# Patient Record
Sex: MALE | Race: WHITE | Employment: FULL TIME | URBAN - METROPOLITAN AREA
[De-identification: names, ages, dates, MRNs, and addresses within clinical notes are randomized per-mention and may not be internally consistent; named-entity substitution may affect disease eponyms.]

---

## 2018-04-30 ENCOUNTER — HOSPITAL ENCOUNTER (EMERGENCY)
Age: 33
Discharge: HOME | End: 2018-04-30
Attending: EMERGENCY MEDICINE
Payer: COMMERCIAL

## 2018-04-30 ENCOUNTER — APPOINTMENT (OUTPATIENT)
Dept: CT IMAGING | Age: 33
End: 2018-04-30
Payer: COMMERCIAL

## 2018-04-30 VITALS
TEMPERATURE: 98.2 F | DIASTOLIC BLOOD PRESSURE: 84 MMHG | OXYGEN SATURATION: 100 % | BODY MASS INDEX: 28 KG/M2 | HEART RATE: 67 BPM | WEIGHT: 200 LBS | RESPIRATION RATE: 17 BRPM | HEIGHT: 71 IN | SYSTOLIC BLOOD PRESSURE: 132 MMHG

## 2018-04-30 DIAGNOSIS — S05.12XA PERIORBITAL CONTUSION OF LEFT EYE, INITIAL ENCOUNTER: ICD-10-CM

## 2018-04-30 DIAGNOSIS — S00.81XA ABRASION OF FACE, INITIAL ENCOUNTER: ICD-10-CM

## 2018-04-30 DIAGNOSIS — S02.2XXA CLOSED FRACTURE OF NASAL BONE, INITIAL ENCOUNTER: Primary | ICD-10-CM

## 2018-04-30 DIAGNOSIS — S09.90XA INJURY OF HEAD, INITIAL ENCOUNTER: ICD-10-CM

## 2018-04-30 PROCEDURE — 6370000000 HC RX 637 (ALT 250 FOR IP): Performed by: EMERGENCY MEDICINE

## 2018-04-30 PROCEDURE — 99283 EMERGENCY DEPT VISIT LOW MDM: CPT

## 2018-04-30 PROCEDURE — 70486 CT MAXILLOFACIAL W/O DYE: CPT

## 2018-04-30 RX ORDER — SULFAMETHOXAZOLE AND TRIMETHOPRIM 800; 160 MG/1; MG/1
1 TABLET ORAL ONCE
Status: COMPLETED | OUTPATIENT
Start: 2018-04-30 | End: 2018-04-30

## 2018-04-30 RX ORDER — SULFAMETHOXAZOLE AND TRIMETHOPRIM 800; 160 MG/1; MG/1
1 TABLET ORAL 2 TIMES DAILY
Qty: 14 TABLET | Refills: 0 | Status: SHIPPED | OUTPATIENT
Start: 2018-04-30 | End: 2018-05-07

## 2018-04-30 RX ADMIN — SULFAMETHOXAZOLE AND TRIMETHOPRIM 1 TABLET: 800; 160 TABLET ORAL at 21:51

## 2018-04-30 ASSESSMENT — ENCOUNTER SYMPTOMS
SORE THROAT: 0
PHOTOPHOBIA: 0
SHORTNESS OF BREATH: 0
ABDOMINAL PAIN: 0
TROUBLE SWALLOWING: 0
DIARRHEA: 0
NAUSEA: 0
VOMITING: 0

## 2018-04-30 ASSESSMENT — PAIN DESCRIPTION - LOCATION: LOCATION: NOSE

## 2018-04-30 ASSESSMENT — PAIN SCALES - GENERAL: PAINLEVEL_OUTOF10: 2

## 2021-07-12 ENCOUNTER — APPOINTMENT (OUTPATIENT)
Dept: GENERAL RADIOLOGY | Age: 36
End: 2021-07-12
Payer: COMMERCIAL

## 2021-07-12 ENCOUNTER — HOSPITAL ENCOUNTER (EMERGENCY)
Age: 36
Discharge: HOME | End: 2021-07-12
Attending: EMERGENCY MEDICINE
Payer: COMMERCIAL

## 2021-07-12 VITALS
OXYGEN SATURATION: 98 % | BODY MASS INDEX: 29.4 KG/M2 | HEART RATE: 63 BPM | SYSTOLIC BLOOD PRESSURE: 112 MMHG | DIASTOLIC BLOOD PRESSURE: 65 MMHG | WEIGHT: 210 LBS | RESPIRATION RATE: 16 BRPM | HEIGHT: 71 IN | TEMPERATURE: 98.2 F

## 2021-07-12 DIAGNOSIS — S39.012A STRAIN OF LUMBAR REGION, INITIAL ENCOUNTER: Primary | ICD-10-CM

## 2021-07-12 PROCEDURE — 6370000000 HC RX 637 (ALT 250 FOR IP): Performed by: EMERGENCY MEDICINE

## 2021-07-12 PROCEDURE — 72072 X-RAY EXAM THORAC SPINE 3VWS: CPT

## 2021-07-12 PROCEDURE — 6360000002 HC RX W HCPCS: Performed by: EMERGENCY MEDICINE

## 2021-07-12 PROCEDURE — 99283 EMERGENCY DEPT VISIT LOW MDM: CPT

## 2021-07-12 PROCEDURE — 72100 X-RAY EXAM L-S SPINE 2/3 VWS: CPT

## 2021-07-12 PROCEDURE — 96372 THER/PROPH/DIAG INJ SC/IM: CPT

## 2021-07-12 RX ORDER — HYDROCODONE BITARTRATE AND ACETAMINOPHEN 5; 325 MG/1; MG/1
1 TABLET ORAL ONCE
Status: COMPLETED | OUTPATIENT
Start: 2021-07-12 | End: 2021-07-12

## 2021-07-12 RX ORDER — AZELASTINE 1 MG/ML
1 SPRAY, METERED NASAL 2 TIMES DAILY
COMMUNITY
Start: 2021-05-25

## 2021-07-12 RX ORDER — CETIRIZINE HYDROCHLORIDE 10 MG/1
10 TABLET ORAL DAILY
COMMUNITY

## 2021-07-12 RX ORDER — LIDOCAINE 50 MG/G
1 PATCH TOPICAL DAILY
Qty: 10 PATCH | Refills: 0 | Status: SHIPPED | OUTPATIENT
Start: 2021-07-12 | End: 2021-07-22

## 2021-07-12 RX ORDER — EPINEPHRINE 0.3 MG/.3ML
INJECTION SUBCUTANEOUS
COMMUNITY
Start: 2021-05-24

## 2021-07-12 RX ORDER — HYDROCODONE BITARTRATE AND ACETAMINOPHEN 5; 325 MG/1; MG/1
1 TABLET ORAL EVERY 6 HOURS PRN
Qty: 10 TABLET | Refills: 0 | Status: SHIPPED | OUTPATIENT
Start: 2021-07-12 | End: 2021-07-15

## 2021-07-12 RX ORDER — ORPHENADRINE CITRATE 30 MG/ML
60 INJECTION INTRAMUSCULAR; INTRAVENOUS ONCE
Status: COMPLETED | OUTPATIENT
Start: 2021-07-12 | End: 2021-07-12

## 2021-07-12 RX ORDER — MORPHINE SULFATE 4 MG/ML
4 INJECTION, SOLUTION INTRAMUSCULAR; INTRAVENOUS ONCE
Status: COMPLETED | OUTPATIENT
Start: 2021-07-12 | End: 2021-07-12

## 2021-07-12 RX ADMIN — MORPHINE SULFATE 4 MG: 4 INJECTION INTRAVENOUS at 09:50

## 2021-07-12 RX ADMIN — HYDROCODONE BITARTRATE AND ACETAMINOPHEN 1 TABLET: 5; 325 TABLET ORAL at 11:24

## 2021-07-12 RX ADMIN — ORPHENADRINE CITRATE 60 MG: 30 INJECTION INTRAMUSCULAR; INTRAVENOUS at 11:05

## 2021-07-12 ASSESSMENT — ENCOUNTER SYMPTOMS
VOMITING: 0
CHEST TIGHTNESS: 0
BACK PAIN: 1
SORE THROAT: 0
ABDOMINAL PAIN: 1
DIARRHEA: 0
NAUSEA: 0
COUGH: 0
PHOTOPHOBIA: 0
CONSTIPATION: 0
SHORTNESS OF BREATH: 0
RHINORRHEA: 0

## 2021-07-12 ASSESSMENT — PAIN SCALES - GENERAL
PAINLEVEL_OUTOF10: 10

## 2021-07-12 NOTE — ED PROVIDER NOTES
[id number] [facility]  [address] [facility] [address]  Phone: [phone]  Fax: [phone]        Pt Name: Boaz Rodriguez  MRN: [id number]  [place] 1985  Date of evaluation: 7/12/21      CHIEF COMPLAINT     Chief Complaint   Patient presents with    Back Pain         HISTORY OF PRESENT ILLNESS  (Location/Symptom, Timing/Onset, Context/Setting, Quality, Duration, Modifying Factors, Severity.)    Boaz Rodriguez is a 39 y.o. male who presents with with back pain, that started in his low back. Now, it radiates through his whole back, his abdomen, and both legs. The pain is worse in the left leg. The pain started on Friday. The he states the pain stops at his knees. No history of trauma. He states he started playing slow pitch softball and noticed pain in his hamstrings about 1 month ago. He denies any heavy lifting. He is able to ambulate. He feels he is having a lot of back spasms. Denies numbness. He does feel tingling in both legs, worse on the left. He reports weakness in both legs while walking. No loss of bowel or bladder control. No saddle anesthesia. He is not an IV drug user. No anticoagulation. No history of malignancy. He states he saw a chiropractor in the past and that he was told he must have broken his back in the past because they thought they saw compression fractures. This was when he was having some neck issues. He states he saw the chiropractor for 8 weeks and the discomfort resolved. No prior MRI or CT. REVIEW OF SYSTEMS    (2-9 systems for level 4, 10 or more for level 5)     Review of Systems   Constitutional: Negative for chills and fever. HENT: Negative for congestion, rhinorrhea and sore throat. Eyes: Negative for photophobia and visual disturbance. Respiratory: Negative for cough, chest tightness and shortness of breath. Cardiovascular: Negative for chest pain and palpitations. Gastrointestinal: Positive for abdominal pain.  Negative for constipation, diarrhea, nausea and vomiting. Genitourinary: Negative for dysuria, frequency and urgency. Musculoskeletal: Positive for back pain. Negative for neck pain. Skin: Negative for rash and wound. Neurological: Positive for weakness. Negative for dizziness, light-headedness, numbness and headaches. Hematological: Negative for adenopathy. Does not bruise/bleed easily. PAST MEDICAL HISTORY    has a past medical history of Deviated septum. SURGICAL HISTORY      has a past surgical history that includes [place] tooth extraction. CURRENTMEDICATIONS       Previous Medications    AZELASTINE (ASTELIN) 0.1 % NASAL SPRAY    1 spray by Nasal route 2 times daily    CETIRIZINE (ZYRTEC) 10 MG TABLET    Take 10 mg by mouth daily    EPINEPHRINE (EPIPEN) 0.3 MG/0.3ML SOAJ INJECTION           ALLERGIES     is allergic to keflex [cephalexin]. FAMILY HISTORY     has no family status information on file. family history is not on file. SOCIAL HISTORY      reports that he has never smoked. He has never used smokeless tobacco. He reports current alcohol use. He reports that he does not use drugs. PHYSICAL EXAM    (up to 7 for level 4, 8 or more for level 5)   INITIAL VITALS:  height is 5' 11\" (1.803 m) and weight is 95.3 kg (210 lb). His oral temperature is 98.2 °F (36.8 °C). His blood pressure is 112/65 and his pulse is 63. His respiration is 16 and oxygen saturation is 98%. Physical Exam  Vitals and nursing note reviewed. Constitutional:       Appearance: Normal appearance. Comments: Appears uncomfortable   HENT:      Head: Normocephalic and atraumatic. Abdominal:      General: Abdomen is flat. Bowel sounds are normal.      Palpations: Abdomen is soft. Tenderness: There is no abdominal tenderness. There is no guarding or rebound. Musculoskeletal:         General: No tenderness. Normal range of motion. Right lower leg: No edema. Left lower leg: No edema. Comments: Spasm noted to paraspinal muscles thoracic and lumbar   Skin:     General: Skin is warm and dry. Capillary Refill: Capillary refill takes less than 2 seconds. Neurological:      Mental Status: He is alert and oriented to person, place, and time. Mental status is at baseline. Sensory: No sensory deficit. Motor: No weakness. Coordination: Coordination normal.      Gait: Gait abnormal.      Deep Tendon Reflexes: Reflexes normal.      Comments: Antalgic gait. Ambulates hunched over. No foot drop. Psychiatric:         Mood and Affect: Mood normal.         Behavior: Behavior normal.         Thought Content: Thought content normal.         DIFFERENTIAL DIAGNOSIS/ MDM:     The patient presents with low back pain without signs of spinal cord compression, cauda equina syndrome, infection, aneurysm or other serious etiology. The patient is neurologically intact and appears stable for discharge. No skin lesions were seen. The pulses are 2/4 bilaterally. The motor is 5/5 bilaterally. The sensation is intact. Given the extremely low risk of these diagnoses further testing and evaluation for these possibilities does not appear to be indicated at this time. The patient was advised to return to the Emergency Department for worsening pain, numbness, weakness, difficulty with urination or defecation, difficulty with ambulation, fever, abdominal pain, coolness or color change to the extremity. The patient understands that at this time there is no evidence for a more malignant underlying process, but the patient also understands that early in the process of an illness or injury, an emergency department workup can be falsely reassuring. Routine discharge counseling was given, and the patient understands that worsening, changing or persistent symptoms should prompt an immediate call or follow up with their primary physician or return to the emergency department.  The importance of appropriate follow up was also discussed. I have reviewed the disposition diagnosis with the patient and or their family/guardian. I have answered their questions and given discharge instructions. They voiced understanding of these instructions and did not have any further questions or complaints. DIAGNOSTIC RESULTS     EKG: All EKG's are interpreted by the Emergency Department Physician who either signs or Co-signs this chart in the absence of a cardiologist.    none    RADIOLOGY:        Interpretation per the Radiologist below, if available at the time of this note:    XR THORACIC SPINE (3 VIEWS)    Result Date: 7/12/2021  EXAMINATION: THREE XRAY VIEWS OF THE THORACIC SPINE; THREE XRAY VIEWS OF THE LUMBAR SPINE 7/12/2021 10:39 am COMPARISON: None. HISTORY: ORDERING SYSTEM PROVIDED HISTORY: back pain TECHNOLOGIST PROVIDED HISTORY: back pain Reason for Exam: Pt has pain to lower back that radiates up into back and down into both legs, more so in the left leg. No known injury, feels like both sides of his upper and lower back have \"knots\" into them Acuity: Acute Type of Exam: Initial 69-year-old male with thoracic and lower back pain FINDINGS: Thoracic spine: Pedicles symmetric in appearance. Visualized ribs grossly unremarkable. Gross preservation of the vertebral body heights. Mild multilevel hypertrophic osteophyte spur formation minimal multilevel disc space narrowing. Alignment well maintained. Upper thoracic vertebral bodies are partially obscured. Lumbar spine: Pedicles symmetric in appearance. Moderate stool burden. Psoas shadows symmetric in appearance. Bilateral SI joints appear patent. Visualized sacral arcuate lines appear intact. Gross preservation of the vertebral body heights and intervertebral disc spaces. Multilevel Schmorl's node deformities. Lumbar spine is imaged from mid T9 vertebral body level to the sacrococcygeal junction on the lateral views.   2 mm retrolisthesis of L5 on S1. Thoracic spine: 1. Minimal to mild degenerative changes in the visualized portions of the thoracic spine. 2. No acute vertebral body height loss or malalignment within the visualized thoracic spine. Lumbar spine: 1. Multilevel Schmorl's node deformities. 2. No acute vertebral body height loss within the lumbar spine. 3. 2 mm retrolisthesis of L5 on S1. XR LUMBAR SPINE (2-3 VIEWS)    Result Date: 7/12/2021  EXAMINATION: THREE XRAY VIEWS OF THE THORACIC SPINE; THREE XRAY VIEWS OF THE LUMBAR SPINE 7/12/2021 10:39 am COMPARISON: None. HISTORY: ORDERING SYSTEM PROVIDED HISTORY: back pain TECHNOLOGIST PROVIDED HISTORY: back pain Reason for Exam: Pt has pain to lower back that radiates up into back and down into both legs, more so in the left leg. No known injury, feels like both sides of his upper and lower back have \"knots\" into them Acuity: Acute Type of Exam: Initial 50-year-old male with thoracic and lower back pain FINDINGS: Thoracic spine: Pedicles symmetric in appearance. Visualized ribs grossly unremarkable. Gross preservation of the vertebral body heights. Mild multilevel hypertrophic osteophyte spur formation minimal multilevel disc space narrowing. Alignment well maintained. Upper thoracic vertebral bodies are partially obscured. Lumbar spine: Pedicles symmetric in appearance. Moderate stool burden. Psoas shadows symmetric in appearance. Bilateral SI joints appear patent. Visualized sacral arcuate lines appear intact. Gross preservation of the vertebral body heights and intervertebral disc spaces. Multilevel Schmorl's node deformities. Lumbar spine is imaged from mid T9 vertebral body level to the sacrococcygeal junction on the lateral views. 2 mm retrolisthesis of L5 on S1. Thoracic spine: 1. Minimal to mild degenerative changes in the visualized portions of the thoracic spine. 2. No acute vertebral body height loss or malalignment within the visualized thoracic spine. Lumbar spine: 1. Multilevel Schmorl's node deformities. 2. No acute vertebral body height loss within the lumbar spine. 3. 2 mm retrolisthesis of L5 on S1. LABS:  No results found for this visit on 07/12/21. none    EMERGENCY DEPARTMENT COURSE:   Vitals:    Vitals:    07/12/21 0918   BP: 112/65   Pulse: 63   Resp: 16   Temp: 98.2 °F (36.8 °C)   TempSrc: Oral   SpO2: 98%   Weight: 95.3 kg (210 lb)   Height: 5' 11\" (1.803 m)     -------------------------  BP: 112/65, Temp: 98.2 °F (36.8 °C), Pulse: 63, Resp: 16      RE-EVALUATION:  10:17 AM EDT  Patient's pain is improved after Morphine. He is moving much better. Strength is symmetrical and intact. DTRs 2+ at the patella tendon. Sensation intact. Still ambulating a bit hunched over, so I will add a muscle relaxer. Plan for x-rays due to history of compression fracture in the past.     CONSULTS:  none    PROCEDURES:  None    FINAL IMPRESSION      1. Strain of lumbar region, initial encounter          DISPOSITION/PLAN   DISPOSITION Decision To Discharge 07/12/2021 11:45:46 AM      CONDITION ON DISPOSITION:   Stable     PATIENT REFERRED TO:  No follow-up provider specified. DISCHARGE MEDICATIONS:  New Prescriptions    HYDROCODONE-ACETAMINOPHEN (NORCO) 5-325 MG PER TABLET    Take 1 tablet by mouth every 6 hours as needed for Pain for up to 3 days. Intended supply: 3 days. Take lowest dose possible to manage pain    LIDOCAINE (LIDODERM) 5 %    Place 1 patch onto the skin daily for 10 days 12 hours on, 12 hours off.        (Please note that portions of this note were completed with a voicerecognition program.  Efforts were made to edit the dictations but occasionally words are mis-transcribed.)    Harshad Chambers MD  Attending Emergency Medicine Physician        Harshad Chambers MD  07/12/21 [id number]

## 2021-07-12 NOTE — ED TRIAGE NOTES
Pt to ED c/o lower back pain starting Friday. Pt denies injury/trauma. Pt states that the pain radiates down both legs but is worse on the left side.  Denies loss of bowel or bladder function